# Patient Record
Sex: FEMALE | Race: BLACK OR AFRICAN AMERICAN | NOT HISPANIC OR LATINO | Employment: OTHER | URBAN - METROPOLITAN AREA
[De-identification: names, ages, dates, MRNs, and addresses within clinical notes are randomized per-mention and may not be internally consistent; named-entity substitution may affect disease eponyms.]

---

## 2019-05-25 ENCOUNTER — HOSPITAL ENCOUNTER (EMERGENCY)
Facility: HOSPITAL | Age: 64
Discharge: HOME | End: 2019-05-25
Attending: EMERGENCY MEDICINE
Payer: MEDICARE

## 2019-05-25 VITALS
SYSTOLIC BLOOD PRESSURE: 126 MMHG | TEMPERATURE: 99 F | BODY MASS INDEX: 26.05 KG/M2 | HEIGHT: 63 IN | RESPIRATION RATE: 18 BRPM | HEART RATE: 100 BPM | WEIGHT: 147 LBS | DIASTOLIC BLOOD PRESSURE: 85 MMHG | OXYGEN SATURATION: 99 %

## 2019-05-25 DIAGNOSIS — F11.90 CHRONIC, CONTINUOUS USE OF OPIOIDS: ICD-10-CM

## 2019-05-25 DIAGNOSIS — G89.29 OTHER CHRONIC PAIN: Primary | ICD-10-CM

## 2019-05-25 PROCEDURE — 99282 EMERGENCY DEPT VISIT SF MDM: CPT | Mod: ,,, | Performed by: NURSE PRACTITIONER

## 2019-05-25 PROCEDURE — 99283 EMERGENCY DEPT VISIT LOW MDM: CPT

## 2019-05-25 PROCEDURE — 99282 PR EMERGENCY DEPT VISIT,LEVEL II: ICD-10-PCS | Mod: ,,, | Performed by: NURSE PRACTITIONER

## 2019-05-25 RX ORDER — LISINOPRIL 10 MG/1
10 TABLET ORAL DAILY
COMMUNITY

## 2019-05-25 RX ORDER — AMLODIPINE BESYLATE 10 MG/1
10 TABLET ORAL DAILY
COMMUNITY

## 2019-05-25 RX ORDER — FOLIC ACID 1 MG/1
1 TABLET ORAL DAILY
COMMUNITY

## 2019-05-25 NOTE — ED TRIAGE NOTES
Pt reports having withdrawal symptoms . Pt is restless and has anxiety and unsteady gate. She reports that she feels off balance.  Pt was trying to wean off of norco pills by herself. Last norco was taken three days ago. Pt prescription states to take it TID but she was taking it 4 to 5 times a day. Denies having auditory, visual, or tactile hallucinations.  Pt ha hx of depression and chronic back and leg pain.  Pt has hx of anxiety attacks with heart palpitations.   Daughter with patient.    Pain : pt states pain 9/10 back and leg pain on left side.      Psychosocial:  Patient is calm and cooperative.  Patients insight and judgement are appropriate to situation.  Appears clean, well maintained, with clothing appropriate to environment.  No evidence of delusions, hallucinations, or psychosis.     Neuro:  Eyes open spontaneously.  Awake, alert, oriented x 4.  Speech clear and appropriate.  Tolerating saliva secretions well.  Able to follow commands, demonstrating ability to actively and appropriately communicate within context of current conversation.  Symmetrical facial muscles.  Moving all extremities well with no noted weakness.      Airway:  Bilateral chest rise and fall.  RR regular and non-labored.  Air entry patent with and clear x 5 lobes of the lungs.  No crepitus or subcutaneous emphysema noted on palpation.       Circulatory:  Skin warm, dry, and pink.  Apical and radial pulses strong and regular.  Capillary refill/skin blanching less than 3 seconds to distal of 4 extremities.     Abdomen:  Abdomen soft and non-distended.  Positive normo-active bowel sounds x 4 quadrants.       Urinary: Denies pressure, frequency, urgency, odor or pain.     Extremities:  No redness, heat, deformity, or pain.     Skin:  Intact with no bruising/discolorations noted.

## 2019-05-25 NOTE — ED PROVIDER NOTES
Encounter Date: 5/25/2019       History     Chief Complaint   Patient presents with    Withdrawal     wanting to get off norco pills from pain management, ran out having withdrawal, denies suicidal/homicidal ideation     Pt is a 65 yo female with medical history of sickle cell, GERD, HTN presenting to the ED for possible opiate withdrawal.  Patient states she has not been on chronic Norco for years due to her sickle cell pain. Patient states she is attempting to wean herself off.  Patient states she took her last dose of Norco 3 days ago.  Patient's daughter states patient did take Percocet yesterday.    The history is provided by the patient, a relative and medical records.     Review of patient's allergies indicates:   Allergen Reactions    Levaquin [levofloxacin]      Swelling and hard to breath    Morphine      Chest pain and sweats      No past medical history on file.  No past surgical history on file.  No family history on file.  Social History     Tobacco Use    Smoking status: Not on file   Substance Use Topics    Alcohol use: Not on file    Drug use: Not on file     Review of Systems   Constitutional: Negative for activity change, appetite change, chills, fatigue and fever.   HENT: Negative for sore throat.    Eyes: Negative.  Negative for photophobia and visual disturbance.   Respiratory: Negative for cough, chest tightness, shortness of breath and wheezing.    Cardiovascular: Negative for chest pain and palpitations.   Gastrointestinal: Negative for abdominal pain, constipation and nausea.   Genitourinary: Negative for decreased urine volume, difficulty urinating, dysuria and urgency.   Musculoskeletal: Negative for arthralgias, back pain, gait problem and myalgias.   Skin: Negative for color change, pallor, rash and wound.   Allergic/Immunologic: Negative.    Neurological: Negative for dizziness, syncope, weakness, numbness and headaches.   Hematological: Does not bruise/bleed easily.    Psychiatric/Behavioral: Negative for agitation, behavioral problems, confusion, decreased concentration, dysphoric mood, hallucinations, self-injury, sleep disturbance and suicidal ideas. The patient is nervous/anxious.    All other systems reviewed and are negative.      Physical Exam     Initial Vitals [05/25/19 1641]   BP Pulse Resp Temp SpO2   126/85 100 18 98.5 °F (36.9 °C) 99 %      MAP       --         Physical Exam    Nursing note and vitals reviewed.  Constitutional: Vital signs are normal. She appears well-developed and well-nourished. She is cooperative. She does not have a sickly appearance. She does not appear ill.   HENT:   Head: Normocephalic and atraumatic.   Mouth/Throat: Uvula is midline, oropharynx is clear and moist and mucous membranes are normal.   Eyes: Conjunctivae, EOM and lids are normal. Pupils are equal, round, and reactive to light.   Neck: Trachea normal, normal range of motion, full passive range of motion without pain and phonation normal. Neck supple.   Cardiovascular: Normal rate and regular rhythm.   Pulses:       Radial pulses are 2+ on the right side, and 2+ on the left side.        Dorsalis pedis pulses are 2+ on the right side, and 2+ on the left side.   Pulmonary/Chest: Effort normal and breath sounds normal.   Abdominal: Soft. Normal appearance and bowel sounds are normal. She exhibits no distension. There is no tenderness. There is no rigidity, no rebound and no guarding.   Musculoskeletal: Normal range of motion.   Neurological: She is alert and oriented to person, place, and time. She has normal strength. No cranial nerve deficit or sensory deficit. She displays a negative Romberg sign. GCS eye subscore is 4. GCS verbal subscore is 5. GCS motor subscore is 6.   Skin: Skin is warm, dry and intact. Capillary refill takes less than 2 seconds. No abrasion, no laceration and no rash noted. No cyanosis. No pallor. Nails show no clubbing.   Psychiatric: Her speech is normal and  "behavior is normal. Her mood appears anxious. Her affect is not angry and not inappropriate. She is not actively hallucinating. Cognition and memory are normal. She does not exhibit a depressed mood. She expresses no homicidal and no suicidal ideation. She expresses no suicidal plans and no homicidal plans. She is attentive.         ED Course   Procedures  Labs Reviewed - No data to display       Imaging Results    None          Medical Decision Making:   Initial Assessment:   Emergent evaluation of a 63 yo female patient presenting to the ER requesting opioid withdrawal treatment.  Patient states she has been on Norco for the past few years" due to sickle cell pain.  Patient states she has recently attempted to wean herself off this medication.  Patient is requesting detox.  On exam patient is A&O x3.  Pupils equal round reactive 2-1 mm.  Cap refill less than 3 sec.  Breath sounds clear bilaterally.  Abdomen soft and nontender.  Pulses regular.  No signs of acute withdrawal noted.  Patient able to ambulate with no assistance.  Negative Romberg noted.  Differential Diagnosis:   Differential diagnoses include but are not limited to depression, bipolar disorder, alcohol/drug abuse, social stressors, medication noncompliance, metabolic abnormality, withdrawal.  ED Management:  I do not feel labs or imaging are pertinent to the care this patient.  Patient denies SI or HI.  Patient requesting opioid withdrawal treatment.  I discussed the care of this patient with my Supervising Physician.  Patient not actively withdrawing from narcotics.  Patient given resources to follow up as an outpatient.  Family requesting a referral for placement.  Advised family they would need to see patient's PCP for referral if needed.  All questions and concerns addressed by family.  Patient is hemodynamically stable, vital signs are normal.   Discharge instructions given. Return to ED precautions discussed. Follow up as directed. Pt " verbalized understanding of this plan. Pt is stable for discharge.                         Clinical Impression:       ICD-10-CM ICD-9-CM   1. Other chronic pain G89.29 338.29   2. Chronic, continuous use of opioids F11.90 305.51         Disposition:   Disposition: Discharged  Condition: Stable                        Emma Michel NP  05/25/19 8428